# Patient Record
Sex: FEMALE | Race: WHITE | URBAN - METROPOLITAN AREA
[De-identification: names, ages, dates, MRNs, and addresses within clinical notes are randomized per-mention and may not be internally consistent; named-entity substitution may affect disease eponyms.]

---

## 2017-01-01 ENCOUNTER — HOSPITAL ENCOUNTER (OUTPATIENT)
Dept: PHYSICAL THERAPY | Facility: CLINIC | Age: 67
Setting detail: THERAPIES SERIES
End: 2017-06-16
Attending: INTERNAL MEDICINE
Payer: MEDICARE

## 2017-01-01 ENCOUNTER — HOSPITAL ENCOUNTER (OUTPATIENT)
Dept: PHYSICAL THERAPY | Facility: CLINIC | Age: 67
Setting detail: THERAPIES SERIES
End: 2017-08-18
Attending: INTERNAL MEDICINE
Payer: MEDICARE

## 2017-01-01 ENCOUNTER — HOSPITAL ENCOUNTER (OUTPATIENT)
Dept: PHYSICAL THERAPY | Facility: CLINIC | Age: 67
Setting detail: THERAPIES SERIES
End: 2017-07-28
Attending: INTERNAL MEDICINE
Payer: MEDICARE

## 2017-01-01 ENCOUNTER — HOSPITAL ENCOUNTER (OUTPATIENT)
Dept: PHYSICAL THERAPY | Facility: CLINIC | Age: 67
Setting detail: THERAPIES SERIES
End: 2017-07-14
Attending: INTERNAL MEDICINE
Payer: MEDICARE

## 2017-01-01 ENCOUNTER — HOSPITAL ENCOUNTER (OUTPATIENT)
Dept: PHYSICAL THERAPY | Facility: CLINIC | Age: 67
Setting detail: THERAPIES SERIES
End: 2017-08-04
Attending: INTERNAL MEDICINE
Payer: MEDICARE

## 2017-01-01 ENCOUNTER — HOSPITAL ENCOUNTER (OUTPATIENT)
Dept: PHYSICAL THERAPY | Facility: CLINIC | Age: 67
Setting detail: THERAPIES SERIES
End: 2017-06-23
Attending: INTERNAL MEDICINE
Payer: MEDICARE

## 2017-01-01 PROCEDURE — 97162 PT EVAL MOD COMPLEX 30 MIN: CPT | Mod: GP | Performed by: PHYSICAL THERAPIST

## 2017-01-01 PROCEDURE — 40000360 ZZHC STATISTIC PT CANCER REHAB VISIT: Performed by: PHYSICAL THERAPIST

## 2017-01-01 PROCEDURE — 97110 THERAPEUTIC EXERCISES: CPT | Mod: GP | Performed by: PHYSICAL THERAPIST

## 2017-01-01 PROCEDURE — 97112 NEUROMUSCULAR REEDUCATION: CPT | Mod: GP | Performed by: PHYSICAL THERAPIST

## 2017-01-01 PROCEDURE — G8978 MOBILITY CURRENT STATUS: HCPCS | Mod: GP,CL | Performed by: PHYSICAL THERAPIST

## 2017-01-01 PROCEDURE — G8979 MOBILITY GOAL STATUS: HCPCS | Mod: GP,CK | Performed by: PHYSICAL THERAPIST

## 2017-01-01 ASSESSMENT — 6 MINUTE WALK TEST (6MWT)
TOTAL DISTANCE WALKED (METERS): 15.54
TOTAL DISTANCE WALKED (METERS): 50

## 2017-06-16 NOTE — PROGRESS NOTES
06/16/17 1400   Quick Adds   Quick Adds Certification   Type of Visit Initial OP PT Evaluation   General Information   Start of Care Date 06/16/17   Referring Physician Felice Bailey MD    Orders Evaluate and Treat as Indicated   Order Date 04/20/17   Medical Diagnosis small cell lung CA unspecified laterality; generalized weakness    Onset of illness/injury or Date of Surgery 04/01/15   Precautions/Limitations oxygen therapy device and L/min   Surgical/Medical history reviewed Yes   Pertinent history of current problem (include personal factors and/or comorbidities that impact the POC) Patient reports that she was diangosed with lung CA in 4/2015, she underwent chemo and radiation that finished up in 5/2015. Since radiation, she has been more deconditioned. She uses supplamental 02 at home, 3-4 L at all times. She is independent with mobility in the house but rarely leaves the house as this is a lot of work for her and she needs help with her 02 tank. She has hx of fall, she has fallen at home a few times in the past with the most recent a few weeks ago- thinks she tripped on something and then hit her body on the coffee table. No injuries to report. Has to walk up 8 steps at least 1 time per day- she is able to do with the rail but this is challenging for patient.   Pertinent Visual History  wears glasses for close up vision    Prior level of function comment independent- uses furniture and walker for balance- needs help with managing 02 tank in the community but at home 02 is connected to wall unit with long cord that she is able to manage.    Current Community Support Family/friend caregiver   Patient role/Employment history Retired   Living environment [place]/[facility]   Home/Community Accessibility Comments house, has stairs- 15 steps total- needs to do at least 8 to get downstairs to couch where she sleeps and also to the bathroom; lives with spouse who works at night    Current Assistive Devices Walker    Assistive Devices Comments has walker- does not use in the house- had furniture to hang onto; takes walker outside [facility]    Patient/Family Goals Statement get stronger in body and with breathing, balance better    General Information Comments On supplamental 02; 3-4 L and checks her sats often- were in the low 80's after walking part way in the clinic. Has been using this since 2016 (been ~1 year). Reports that 02 sats are usually in the low 90's but will be in upper 80's at times too.    Fall Risk Screen   Fall screen completed by PT   Per patient - Fall 2 or more times in past year? Yes   Per patient - Fall with injury in past year? No   Timed Up and Go score (seconds) (too fatigued to perfrom today- will assess at future session)   Is patient a fall risk? Yes   Fall screen comments Patient very deconditioned and has fallen at home recently from loosing her balance or tripping.    System Outcome Measures   Outcome Measures Cancer Rehab   FACIT Fatigue Subscale (score out of 52). The higher the score, the better the QOL. 20   Six Minute Walk (meters). An increase of 70 or more meters indicates statistically significant change. 15.54  (2 MWT choosen as she would not tolerate 6MWT d/t SOB)   Pain   Pain comments denies pain- does sometimes gets pain in low back    Vital Signs   Vital Signs Pulse;BP;SpO2   Pulse 111   /73   SpO2 88 %   Cognitive Status Examination   Orientation orientation to person, place and time   Level of Consciousness alert   Follows Commands and Answers Questions 100% of the time   Personal Safety and Judgment intact   Memory impaired   Cognitive Comment reports memory issues from chemotherapy treatment    Integumentary   Integumentary Comments overall skill on LEs look good when feet were assessed- did have small nader between 4th and 5th digit that patient showed PT- did not appear to be an open wound but recommended to continue check as patient as increased risk for skin break down     Posture   Posture Comments foward shoulder posture; and rounded low back from poor posture    Range of Motion (ROM)   ROM Comment decreaed DF to neutral B, otherwise, LE and UE WFL    Strength   Strength Comments 3+/5 B DF, 4+/5 knee extensors, 3+/5 knee flexors, 4/5 hip flexors, 3+/5 hip ABD, 3+/5 shoulder flexors/abductors, 4/5 elbow flexors/extensors, weak  strength    Bed Mobility   Bed Mobility Comments reports being independent but can get SOB with this    Transfer Skills   Transfer Comments Able to stand up without use of UEs but this is challenging for patient- prefers to use her arms    Gait   Gait Gait Analysis   Gait Analysis   Gait Pattern Used swing-through gait   Gait Deviations Noted decreased nazario;decreased swing-to-stance ratio;increased time in double stance   Impairments Contributing to Gait Deviations impaired balance;decreased ROM;decreased sensation;decreased strength  (deconditioning )   Gait Special Tests   Gait Special Tests OTHER  (2 minute walk test- 50 feet total with walker )   Balance   Balance Comments fair standing balance- needs to hold onto 02 tank or walker for support    Balance Special Tests   Balance Special Tests Sit to stand reps   Balance Special Tests Sit to Stand Reps in 30 Seconds   Reps in 30 seconds 5   Height 19   Sensory Examination   Sensory Perception Comments mild sensory deficits in B feet at great toes- otherwise, was intact to light touch- does report that she does get tingling and numbness d/t neuropathy and hx of chemotherapy    Planned Therapy Interventions   Planned Therapy Interventions balance training;bed mobility training;gait training;neuromuscular re-education;ROM;strengthening;stretching;transfer training   Clinical Impression   Criteria for Skilled Therapeutic Interventions Met yes, treatment indicated   PT Diagnosis weakness and deconditioning    Influenced by the following impairments impaired balnace, decreased strength and ROM, poor  posture, decreased activity tolerance, supplamental 02 reliance    Functional limitations due to impairments at increased risk for falls, unable to participate in ADLs fully in standing position, increased fatigue with jewerly making hobby    Clinical Presentation Evolving/Changing   Clinical Presentation Rationale very fatigued, supplamental 02, severely deconditioned and poor tolerance to activity, PT impairments    Clinical Decision Making (Complexity) Moderate complexity   Therapy Frequency 2 times/Week   Predicted Duration of Therapy Intervention (days/wks) 90 day   Risk & Benefits of therapy have been explained Yes   Patient, Family & other staff in agreement with plan of care Yes   Clinical Impression Comments Patient presents with moderate-severe deconditioning with poor strength and balance in the setting of CA hx in 2015. She relies on 3-4 L of supplamental 02 at all times and has trouble leaving the house and participating in community activites d/t to these issues. Patient will benefit from skilled OP PT in order to improve her strength and actvity tolerance as well as balance in order to improve her quality of life and decrease her risk of falls.    Education Assessment   Preferred Learning Style Listening;Demonstration   Barriers to Learning Cognitive   GOALS   PT Eval Goals 1;2;3;4   Goal 1   Goal Identifier cardiopulmonary endurance   Goal Description Patient will perform continuous activity on Nu-step x 5-10 minutes without needing rest break in order to work toward having enough functional endurance to  kitchen and make her coffee in the morning without needing a rest break with stable vitals.    Target Date 09/13/17   Goal 2   Goal Identifier 2 MWT   Goal Description Patient will increase her distance on 2MWT by 100' in order to show improvement with aerobic endurance and walking speed.    Target Date 09/13/17   Goal 3   Goal Identifier FACIT   Goal Description Patient will score at 35/52  on FACIT in order to show decrease in her fatigue during the day in order to more actively participate in taks at home without needing as many rest breaks.    Target Date 09/13/17   Goal 4   Goal Identifier HEP   Goal Description Patient will demonstrate compliance and independence with HEP in order to contiune working on balance, strength and functional endurance to mor easily participate in daily tasks.    Target Date 09/13/17   Total Evaluation Time   Total Evaluation Time (Minutes) 45   Therapy Certification   Certification date from 06/16/17   Certification date to 09/13/17   Medical Diagnosis small cell lung CA unspecified laterality; generalized weakness    Certification I certify the need for these services furnished under this plan of treatment and while under my care.  (Physician co-signature of this document indicates review and certification of the therapy plan).

## 2017-06-19 NOTE — PROGRESS NOTES
[facility]        OUTPATIENT PHYSICAL THERAPY FUNCTIONAL EVALUATION  PLAN OF TREATMENT FOR OUTPATIENT REHABILITATION  (COMPLETE FOR INITIAL CLAIMS ONLY)  Patient's Last Name, First Name, M.I.  YOB: 1950  Sasha Montiel        Provider's Name   [facility]   Medical Record No.  [id number]     Start of Care Date:  06/16/17   Onset Date:  04/01/15   Type:     _X__PT   ____OT  ____SLP Medical Diagnosis:  small cell lung CA unspecified laterality; generalized weakness      PT Diagnosis:  weakness and deconditioning  Visits from SOC:  1                              __________________________________________________________________________________  Plan of Treatment/Functional Goals:  balance training, bed mobility training, gait training, neuromuscular re-education, ROM, strengthening, stretching, transfer training           GOALS  cardiopulmonary endurance  Patient will perform continuous activity on Nu-step x 5-10 minutes without needing rest break in order to work toward having enough functional endurance to  kitchen and make her coffee in the morning without needing a rest break with stable vitals.   09/13/17    2 MWT  Patient will increase her distance on 2MWT by 100' in order to show improvement with aerobic endurance and walking speed.   09/13/17    FACIT  Patient will score at 35/52 on FACIT in order to show decrease in her fatigue during the day in order to more actively participate in taks at home without needing as many rest breaks.   09/13/17    HEP  Patient will demonstrate compliance and independence with HEP in order to contiune working on balance, strength and functional endurance to mor easily participate in daily tasks.   09/13/17    Therapy Frequency:  2 times/Week   Predicted Duration of Therapy Intervention:  90 day    Clarissa Turk  PT                                    I CERTIFY THE NEED FOR THESE SERVICES FURNISHED UNDER        THIS PLAN OF TREATMENT AND WHILE UNDER MY CARE .             Physician Signature               Date    X_____________________________________________________                      Certification Date From:  06/16/17   Certification Date To:  09/13/17    Referring Provider:  Felice Bailey MD     Initial Assessment  See Epic Evaluation- Start of Care Date: 06/16/17

## 2017-06-19 NOTE — ADDENDUM NOTE
Encounter addended by: Clarissa Turk PT on: 6/19/2017  9:10 AM<BR>     Actions taken: Sign clinical note